# Patient Record
Sex: MALE | Race: WHITE | URBAN - METROPOLITAN AREA
[De-identification: names, ages, dates, MRNs, and addresses within clinical notes are randomized per-mention and may not be internally consistent; named-entity substitution may affect disease eponyms.]

---

## 2019-04-29 ENCOUNTER — OFFICE VISIT (OUTPATIENT)
Dept: FAMILY MEDICINE | Facility: OTHER | Age: 24
End: 2019-04-29
Payer: COMMERCIAL

## 2019-04-29 VITALS
WEIGHT: 172 LBS | SYSTOLIC BLOOD PRESSURE: 118 MMHG | TEMPERATURE: 98.3 F | HEART RATE: 109 BPM | BODY MASS INDEX: 27.64 KG/M2 | DIASTOLIC BLOOD PRESSURE: 70 MMHG | HEIGHT: 66 IN | RESPIRATION RATE: 16 BRPM | OXYGEN SATURATION: 98 %

## 2019-04-29 DIAGNOSIS — L60.0 INGROWING NAIL WITH INFECTION: Primary | ICD-10-CM

## 2019-04-29 PROCEDURE — 99203 OFFICE O/P NEW LOW 30 MIN: CPT | Performed by: PHYSICIAN ASSISTANT

## 2019-04-29 RX ORDER — CEPHALEXIN 500 MG/1
500 CAPSULE ORAL 2 TIMES DAILY
Qty: 20 CAPSULE | Refills: 0 | Status: SHIPPED | OUTPATIENT
Start: 2019-04-29

## 2019-04-29 ASSESSMENT — MIFFLIN-ST. JEOR: SCORE: 1717.94

## 2019-04-29 NOTE — PATIENT INSTRUCTIONS
You have in ingrowing toenail with infection.  Will place you on Keflex to take twice daily for 10 days.  Take a probiotic while on this.  Soak your foot in warm, soapy water 3 times daily.  Avoid constricting shows.    Follow up tomorrow with Dr. Sotelo for further evaluation.

## 2019-04-29 NOTE — Clinical Note
Hal,I saw a young man today with an infected, ingrowing left great toenail so started him on antibiotics and am having him see you tomorrow in your same day 2:00-2:30 slot if that's okay. Thanks!Kyler Higuera PA-C

## 2019-04-29 NOTE — PROGRESS NOTES
"  SUBJECTIVE:   Julian Botello is a 23 year old male who presents to clinic today for the following health issues:      HPI     Concern - ingrown toenail  Onset: 3 weeks    Description:   Left foot, big toe    Progression of Symptoms:  worsening    Accompanying Signs & Symptoms:  9/10 when touching  7/10 when walking  Draining pus/blood - so far today it has been blood in the afternoon    Previous history of similar problem:   Yes, but not this bad before    Precipitating factors:   Worsened by: walking, touching    Alleviating factors:  Improved by: NA    Therapies Tried and outcome: NA     Symptoms started 3 weeks ago after his cut his left great toenail too short. He has since developed pain, redness and purulent drainage, worse over the past few days. He denies any fevers or chills. He tried to pull a piece of skin off the other day but it caused more bleeding.    Additional history: none    Reviewed and updated as needed this visit by clinical staff  Tobacco  Allergies  Meds  Med Hx  Surg Hx  Fam Hx  Soc Hx        Reviewed and updated as needed this visit by Provider       ROS:  GENERAL: Denies fever, fatigue, weakness, weight gain, or weight loss.  SKIN: +Painful, red, draining left great toenail.    OBJECTIVE:     /70   Pulse 109   Temp 98.3  F (36.8  C) (Temporal)   Resp 16   Ht 1.676 m (5' 6\")   Wt 78 kg (172 lb)   SpO2 98%   BMI 27.76 kg/m    Body mass index is 27.76 kg/m .  GENERAL: healthy, alert and no distress  SKIN: Tenderness, erythema, and serosanguinous drainage over the lateral left great toenail with ingrowing nail.     ASSESSMENT/PLAN:       ICD-10-CM    1. Ingrowing nail with infection L60.0 cephALEXin (KEFLEX) 500 MG capsule     ORTHO  REFERRAL       Findings consistent with ingrowing left great toenail with infection. Will treat with Keflex twice daily for 10 days and I recommend warm, soapy water soaks 3-4 times daily. He should avoid occlusive footwear and I will " have him follow up with Dr. Sotelo tomorrow for further evaluation and likely removal of ingrowing nail.     Aquiles Higuera PA-C  [facility]

## 2019-04-30 ENCOUNTER — OFFICE VISIT (OUTPATIENT)
Dept: PODIATRY | Facility: OTHER | Age: 24
End: 2019-04-30
Attending: PHYSICIAN ASSISTANT
Payer: COMMERCIAL

## 2019-04-30 VITALS
TEMPERATURE: 98.2 F | BODY MASS INDEX: 27.64 KG/M2 | DIASTOLIC BLOOD PRESSURE: 72 MMHG | HEIGHT: 66 IN | WEIGHT: 172 LBS | SYSTOLIC BLOOD PRESSURE: 108 MMHG

## 2019-04-30 DIAGNOSIS — L60.0 INGROWING NAIL: Primary | ICD-10-CM

## 2019-04-30 PROCEDURE — 11750 EXCISION NAIL&NAIL MATRIX: CPT | Mod: TA | Performed by: PODIATRIST

## 2019-04-30 PROCEDURE — 99243 OFF/OP CNSLTJ NEW/EST LOW 30: CPT | Mod: 25 | Performed by: PODIATRIST

## 2019-04-30 ASSESSMENT — MIFFLIN-ST. JEOR: SCORE: 1717.94

## 2019-04-30 ASSESSMENT — PAIN SCALES - GENERAL: PAINLEVEL: MODERATE PAIN (4)

## 2019-04-30 NOTE — PROGRESS NOTES
HPI:  Julian Botello is a 23 year old male who is seen in consultation at the request of Aquiels Higuera PA-C    Pt presents for eval of:   (Onset, Location, L/R, Character, Treatments, Injury if yes)     Onset early April 2019, Ingrown lateral Left great toenail.  Constant, redness, drainage, swelling, throbbing, pain 4 worse w/pressure  Soaking, tried to trim the nail  4/29/2019 - started Keflex 500 mg, 1 capsule 2 times daily for 10 days.    Works at [employer].    Weight management plan: Patient was referred to their PCP to discuss a diet and exercise plan.      Review of Systems:  Patient denies fever, chills, rash, wound, stiffness, limping, numbness, weakness, heart burn, blood in stool, chest pain with activity, calf pain when walking, shortness of breath with activity, chronic cough, easy bleeding/bruising, swelling of ankles, excessive thirst, fatigue, depression, anxiety.  Pt admits to the symptoms noted in history above.     PAST MEDICAL HISTORY: History reviewed. No pertinent past medical history.     PAST SURGICAL HISTORY: History reviewed. No pertinent surgical history.     MEDICATIONS:   Current Outpatient Medications:      cephALEXin (KEFLEX) 500 MG capsule, Take 1 capsule (500 mg) by mouth 2 times daily, Disp: 20 capsule, Rfl: 0     NO ACTIVE MEDICATIONS, , Disp: , Rfl:      ALLERGIES:  No Known Allergies     SOCIAL HISTORY:   Social History     Socioeconomic History     Marital status: Single     Spouse name: Not on file     Number of children: Not on file     Years of education: Not on file     Highest education level: Not on file   Occupational History     Not on file   Social Needs     Financial resource strain: Not on file     Food insecurity:     Worry: Not on file     Inability: Not on file     Transportation needs:     Medical: Not on file     Non-medical: Not on file   Tobacco Use     Smoking status: Current Every Day Smoker     Smokeless tobacco: Never Used   Substance and Sexual Activity  "    Alcohol use: Not Currently     Drug use: Never     Sexual activity: Yes   Lifestyle     Physical activity:     Days per week: Not on file     Minutes per session: Not on file     Stress: Not on file   Relationships     Social connections:     Talks on phone: Not on file     Gets together: Not on file     Attends Muslim service: Not on file     Active member of club or organization: Not on file     Attends meetings of clubs or organizations: Not on file     Relationship status: Not on file     Intimate partner violence:     Fear of current or ex partner: Not on file     Emotionally abused: Not on file     Physically abused: Not on file     Forced sexual activity: Not on file   Other Topics Concern     Not on file   Social History Narrative     Not on file        FAMILY HISTORY: History reviewed. No pertinent family history.     EXAM:Vitals: /72 (BP Location: Right arm, Cuff Size: Adult Regular)   Temp 98.2  F (36.8  C) (Temporal)   Ht 1.676 m (5' 6\")   Wt 78 kg (172 lb)   BMI 27.76 kg/m    BMI= Body mass index is 27.76 kg/m .    General appearance: Patient is alert and fully cooperative with history & exam.  No sign of distress is noted during the visit.     Psychiatric: Affect is pleasant & appropriate.  Patient appears motivated to improve health.     Respiratory: Breathing is regular & unlabored while sitting.     HEENT: Hearing is intact to spoken word.  Speech is clear.  No gross evidence of visual impairment that would impact ambulation.     Vascular: DP & PT pulses are intact & regular, CFT immediate, positive digital hair growth bilaterally.  No significant edema or varicosities noted and skin temperature is normal to both lower extremities.     Neurologic: Lower extremity sensation is intact to light touch.  No evidence of weakness or contracture in the lower extremities.  No evidence of neuropathy.    Dermatologic: Adequate texture, turgor and tone about the integument.  No discoloration or " thickening of the toenail however the left lateral hallux nail border(s) are ingrown with localized erythema, discomfort and purulent drainage.     Musculoskeletal: Patient is ambulatory without assistive device or brace.  No gross ankle deformity noted.  No foot or ankle joint effusion is noted.     ASSESSMENT:       ICD-10-CM    1. Ingrowing nail L60.0        Plan:   4/30/2019  We reviewed medical history and EPIC chart.  After obtaining informed consent to permanently remove the left lateral hallux nail(s), I utilized 3 cc of lidocaine plain to achieve local anesthesia per digit.  The toenails were then prepped with Betadine in usual fashion.  A quarter inch Penrose drain was then utilized for hemostasis.  100% of the toenail border was avulsed utilizing a nail elevator, english anvil, 6100 blade and hemostat.  The proximal root portion of the nail was confirmed to be removed atraumatically.  Three applications of 89% phenol were applied to the surgical site for 30 seconds each followed by a curette after each application.  Surgical site was then flushed with alcohol and dressed with bacitracin and a nonadherent compression dressing.  Tourniquet was removed after approximately 3 minutes followed by immediate hyperemia to the distal aspect of the hallux.  Written postoperative instructions were dispensed and patient instructed to follow up in 1-2 weeks with any questions, pain,drainage, delayed healing or concerns.  I answered all questions to patients satisfaction.     If patient calls in the next 1-3 weeks with continued redness, pain or drainage I would recommend beginning oral Keflex 500 mg, 4 times a day ×10 days, after confirming there is no allergy.      Hal Sotelo DPM

## 2019-04-30 NOTE — LETTER
4/30/2019         RE: Julian Botello  [address]  [facility] [address]        Dear Colleague,    Thank you for referring your patient, Julian Botello, to the [facility]. Please see a copy of my visit note below.    HPI:  Julian Botello is a 23 year old male who is seen in consultation at the request of Aquiles Higuera PA-C    Pt presents for eval of:   (Onset, Location, L/R, Character, Treatments, Injury if yes)     Onset early April 2019, Ingrown lateral Left great toenail.  Constant, redness, drainage, swelling, throbbing, pain 4 worse w/pressure  Soaking, tried to trim the nail  4/29/2019 - started Keflex 500 mg, 1 capsule 2 times daily for 10 days.    Works at [employer].    Weight management plan: Patient was referred to their PCP to discuss a diet and exercise plan.      Review of Systems:  Patient denies fever, chills, rash, wound, stiffness, limping, numbness, weakness, heart burn, blood in stool, chest pain with activity, calf pain when walking, shortness of breath with activity, chronic cough, easy bleeding/bruising, swelling of ankles, excessive thirst, fatigue, depression, anxiety.  Pt admits to the symptoms noted in history above.     PAST MEDICAL HISTORY: History reviewed. No pertinent past medical history.     PAST SURGICAL HISTORY: History reviewed. No pertinent surgical history.     MEDICATIONS:   Current Outpatient Medications:      cephALEXin (KEFLEX) 500 MG capsule, Take 1 capsule (500 mg) by mouth 2 times daily, Disp: 20 capsule, Rfl: 0     NO ACTIVE MEDICATIONS, , Disp: , Rfl:      ALLERGIES:  No Known Allergies     SOCIAL HISTORY:   Social History     Socioeconomic History     Marital status: Single     Spouse name: Not on file     Number of children: Not on file     Years of education: Not on file     Highest education level: Not on file   Occupational History     Not on file   Social Needs     Financial resource strain: Not on file     Food insecurity:     Worry: Not  "on file     Inability: Not on file     Transportation needs:     Medical: Not on file     Non-medical: Not on file   Tobacco Use     Smoking status: Current Every Day Smoker     Smokeless tobacco: Never Used   Substance and Sexual Activity     Alcohol use: Not Currently     Drug use: Never     Sexual activity: Yes   Lifestyle     Physical activity:     Days per week: Not on file     Minutes per session: Not on file     Stress: Not on file   Relationships     Social connections:     Talks on phone: Not on file     Gets together: Not on file     Attends Worship service: Not on file     Active member of club or organization: Not on file     Attends meetings of clubs or organizations: Not on file     Relationship status: Not on file     Intimate partner violence:     Fear of current or ex partner: Not on file     Emotionally abused: Not on file     Physically abused: Not on file     Forced sexual activity: Not on file   Other Topics Concern     Not on file   Social History Narrative     Not on file        FAMILY HISTORY: History reviewed. No pertinent family history.     EXAM:Vitals: /72 (BP Location: Right arm, Cuff Size: Adult Regular)   Temp 98.2  F (36.8  C) (Temporal)   Ht 1.676 m (5' 6\")   Wt 78 kg (172 lb)   BMI 27.76 kg/m     BMI= Body mass index is 27.76 kg/m .    General appearance: Patient is alert and fully cooperative with history & exam.  No sign of distress is noted during the visit.     Psychiatric: Affect is pleasant & appropriate.  Patient appears motivated to improve health.     Respiratory: Breathing is regular & unlabored while sitting.     HEENT: Hearing is intact to spoken word.  Speech is clear.  No gross evidence of visual impairment that would impact ambulation.     Vascular: DP & PT pulses are intact & regular, CFT immediate, positive digital hair growth bilaterally.  No significant edema or varicosities noted and skin temperature is normal to both lower extremities.   "   Neurologic: Lower extremity sensation is intact to light touch.  No evidence of weakness or contracture in the lower extremities.  No evidence of neuropathy.    Dermatologic: Adequate texture, turgor and tone about the integument.  No discoloration or thickening of the toenail however the left lateral hallux nail border(s) are ingrown with localized erythema, discomfort and purulent drainage.     Musculoskeletal: Patient is ambulatory without assistive device or brace.  No gross ankle deformity noted.  No foot or ankle joint effusion is noted.     ASSESSMENT:       ICD-10-CM    1. Ingrowing nail L60.0        Plan:   4/30/2019  We reviewed medical history and EPIC chart.  After obtaining informed consent to permanently remove the left lateral hallux nail(s), I utilized 3 cc of lidocaine plain to achieve local anesthesia per digit.  The toenails were then prepped with Betadine in usual fashion.  A quarter inch Penrose drain was then utilized for hemostasis.  100% of the toenail border was avulsed utilizing a nail elevator, english anvil, 6100 blade and hemostat.  The proximal root portion of the nail was confirmed to be removed atraumatically.  Three applications of 89% phenol were applied to the surgical site for 30 seconds each followed by a curette after each application.  Surgical site was then flushed with alcohol and dressed with bacitracin and a nonadherent compression dressing.  Tourniquet was removed after approximately 3 minutes followed by immediate hyperemia to the distal aspect of the hallux.  Written postoperative instructions were dispensed and patient instructed to follow up in 1-2 weeks with any questions, pain,drainage, delayed healing or concerns.  I answered all questions to patients satisfaction.     If patient calls in the next 1-3 weeks with continued redness, pain or drainage I would recommend beginning oral Keflex 500 mg, 4 times a day ×10 days, after confirming there is no allergy.       Hal Sotelo DPM      Again, thank you for allowing me to participate in the care of your patient.        Sincerely,        Hal Sotelo DPM